# Patient Record
Sex: FEMALE | Race: WHITE | NOT HISPANIC OR LATINO | Employment: OTHER | URBAN - METROPOLITAN AREA
[De-identification: names, ages, dates, MRNs, and addresses within clinical notes are randomized per-mention and may not be internally consistent; named-entity substitution may affect disease eponyms.]

---

## 2024-07-20 ENCOUNTER — OFFICE VISIT (OUTPATIENT)
Age: 54
End: 2024-07-20
Payer: COMMERCIAL

## 2024-07-20 VITALS
TEMPERATURE: 99 F | WEIGHT: 208 LBS | RESPIRATION RATE: 18 BRPM | HEART RATE: 87 BPM | HEIGHT: 67 IN | SYSTOLIC BLOOD PRESSURE: 130 MMHG | DIASTOLIC BLOOD PRESSURE: 76 MMHG | BODY MASS INDEX: 32.65 KG/M2 | OXYGEN SATURATION: 99 %

## 2024-07-20 DIAGNOSIS — L03.313 CELLULITIS OF CHEST WALL: Primary | ICD-10-CM

## 2024-07-20 PROCEDURE — 99203 OFFICE O/P NEW LOW 30 MIN: CPT | Performed by: PREVENTIVE MEDICINE

## 2024-07-20 RX ORDER — TAMOXIFEN CITRATE 20 MG/1
20 TABLET ORAL DAILY
COMMUNITY
Start: 2024-01-31

## 2024-07-20 RX ORDER — CEPHALEXIN 500 MG/1
500 CAPSULE ORAL EVERY 6 HOURS SCHEDULED
Qty: 28 CAPSULE | Refills: 0 | Status: SHIPPED | OUTPATIENT
Start: 2024-07-20 | End: 2024-07-27

## 2024-07-20 RX ORDER — ACETAMINOPHEN 325 MG/1
650 TABLET ORAL
COMMUNITY

## 2024-07-20 NOTE — PROGRESS NOTES
[facility]        NAME: Grace Lopez is a 54 y.o. female  : 1970    MRN: [id number]  DATE: 2024  TIME: 11:51 AM    Assessment and Plan   Cellulitis of chest wall [L03.313]  1. Cellulitis of chest wall  cephalexin (KEFLEX) 500 mg capsule            Patient Instructions       Follow up with PCP in 3-5 days.  Proceed to  ER if symptoms worsen.    If tests have been performed at [facility], our office will contact you with results if changes need to be made to the care plan discussed with you at the visit.  You can review your full results on [facility].    Chief Complaint     Chief Complaint   Patient presents with    Cellulitis     C/o redness and burning feeling on ride side mid upper back. Pt. States she has a hx of breast cancer and lymphoedema in that area and has had similar sx before.          History of Present Illness       Warmth and swelling right lateral thorax where her lymphedema is        Review of Systems   Review of Systems   Skin:  Positive for color change.         Current Medications       Current Outpatient Medications:     acetaminophen (TYLENOL) 325 mg tablet, Take 650 mg by mouth, Disp: , Rfl:     cephalexin (KEFLEX) 500 mg capsule, Take 1 capsule (500 mg total) by mouth every 6 (six) hours for 7 days, Disp: 28 capsule, Rfl: 0    tamoxifen (NOLVADEX) 20 mg tablet, Take 20 mg by mouth daily, Disp: , Rfl:     tapentadol (NUCYNTA) 50 mg tablet, Take 50 mg by mouth, Disp: , Rfl:     Tapentadol HCl ER 50 MG TB12, Take 50 mg by mouth 2 (two) times a day, Disp: , Rfl:     Current Allergies     Allergies as of 2024 - Reviewed 2024   Allergen Reaction Noted    Iodides Anaphylaxis 2015    Iodinated contrast media Anaphylaxis 2012    Penicillins Anaphylaxis and Hives 2012    Leuprolide Fever 2012    Prochlorperazine Other (See Comments) 2012    Sulfa antibiotics Other (See Comments) 2012            The following portions  "of the patient's history were reviewed and updated as appropriate: allergies, current medications, past family history, past medical history, past social history, past surgical history and problem list.     Past Medical History:   Diagnosis Date    Cancer (HCC) 2015    tom. breast removal    Chronic pain disorder     r chest, back    Ovarian tumor 2012    left side       Past Surgical History:   Procedure Laterality Date    HYSTERECTOMY      full    LAPAROSCOPIC ENDOMETRIOSIS FULGURATION      MASTECTOMY Bilateral 2015    [place] EXC TUMOR SOFT TISS BACK/FLANK SUBFASCIAL 5 CM/> Right 8/17/2018    Procedure: EXCISION  BIOPSY LESION/MASS BACK;  Surgeon: Bolivar Lam MD;  Location:  MAIN OR;  Service: General    DE EXC TUMOR SOFT TISSUE ABDL WALL SUBFASCIAL <5CM Right 8/17/2018    Procedure: EXCISION  BIOPSY LESION/MASS ABDOMINAL/CHEST WALL;  Surgeon: Bolivar Lam MD;  Location:  MAIN OR;  Service: General    TUBAL LIGATION         Family History   Problem Relation Age of Onset    Hypertension Mother     Diabetes Father     Cancer Sister 45        tom. breast cancer    Hypertension Maternal Grandmother     Hypertension Maternal Grandfather     Hypertension Paternal Grandmother     Hypertension Paternal Grandfather          Medications have been verified.        Objective   /76 (BP Location: Left arm, Patient Position: Sitting)   Pulse 87   Temp 99 °F (37.2 °C)   Resp 18   Ht 5' 6.5\" (1.689 m)   Wt 94.3 kg (208 lb)   SpO2 99%   BMI 33.07 kg/m²   No LMP recorded. Patient has had a hysterectomy.       Physical Exam     Physical Exam  Skin:     Comments: Mild warmth swelling and erythema right lateral chest wall where her lymphedema is.       Note, she has told me that she has taken Keflex in the past without incident or allergy.            "

## 2025-02-27 ENCOUNTER — OFFICE VISIT (OUTPATIENT)
Age: 55
End: 2025-02-27
Payer: COMMERCIAL

## 2025-02-27 VITALS
RESPIRATION RATE: 18 BRPM | DIASTOLIC BLOOD PRESSURE: 86 MMHG | HEIGHT: 67 IN | HEART RATE: 112 BPM | TEMPERATURE: 98.7 F | WEIGHT: 194 LBS | BODY MASS INDEX: 30.45 KG/M2 | SYSTOLIC BLOOD PRESSURE: 132 MMHG | OXYGEN SATURATION: 97 %

## 2025-02-27 DIAGNOSIS — R25.1 SHAKING: ICD-10-CM

## 2025-02-27 DIAGNOSIS — H66.001 NON-RECURRENT ACUTE SUPPURATIVE OTITIS MEDIA OF RIGHT EAR WITHOUT SPONTANEOUS RUPTURE OF TYMPANIC MEMBRANE: Primary | ICD-10-CM

## 2025-02-27 LAB — GLUCOSE SERPL-MCNC: 93 MG/DL (ref 65–140)

## 2025-02-27 PROCEDURE — 99213 OFFICE O/P EST LOW 20 MIN: CPT

## 2025-02-27 RX ORDER — AZITHROMYCIN 250 MG/1
TABLET, FILM COATED ORAL
Qty: 6 TABLET | Refills: 0 | Status: SHIPPED | OUTPATIENT
Start: 2025-02-27 | End: 2025-03-03

## 2025-02-27 RX ORDER — TAPENTADOL HYDROCHLORIDE 50 MG/1
TABLET, FILM COATED ORAL
COMMUNITY

## 2025-02-27 NOTE — PROGRESS NOTES
[facility]        NAME: Grace Lopez is a 54 y.o. female  : 1970    MRN: [id number]  DATE: 2025  TIME: 6:20 PM    Assessment and Plan   Non-recurrent acute suppurative otitis media of right ear without spontaneous rupture of tympanic membrane [H66.001]  1. Non-recurrent acute suppurative otitis media of right ear without spontaneous rupture of tympanic membrane  azithromycin (ZITHROMAX) 250 mg tablet      2. Shaking  Fingerstick Glucose (POCT)        Decongestants and flonase recommended for congestion. Hydration, humidifier, rest.   Advised patient to make apt to see PCP regarding shaking and tremor. Discussed they need to do a full workup regarding this, likely testing her thyroid function as well. Glucose in office today was WNL. Patient agreeable.   Follow up with PCP in 3-5 days if no improvement. Proceed to ER if symptoms worsen.    Medical Decision Making     PROBLEM: 1 acute, uncomplicated illness or injury    DATA: Note(s) Reviewed: yes, chart review    RISK: Prescription drug management    TIME: 20 min     Chief Complaint     Chief Complaint   Patient presents with    Shaking     Around the end of last month she was fighting a cold and now after about a month she still has a cough, congestion, and clear mucus. About four days she began shaking. She noticed that when she eats sugar she stops shaking but it only controls it for a short period. She has tried changing her diet to help the shaking but noticed no relief. She has also lost weight due to poor appetite. Yesterday she began experiencing right ear pain. She has only tried Museux but hasn't helped.      History of Present Illness     Grace Lopez is a 54 y.o. female presenting to the office today for upper respiratory complaints.   Symptoms have been present for 30+ days, and include cough, congestion, clear mucus, right ear pain.   She also notes about 4 days ago she began shaking, and eating sugar helped this  symptom. She does note she has lost weight recently due to poor appetite. She does note she has been eating, but not as much. She is unsure why the tremor started and she would like her glucose checked.  She has tried Mucinex for her symptoms, minimal relief.    Review of Systems     Review of Systems   Constitutional:  Negative for chills and fever.   HENT:  Positive for congestion. Negative for sore throat and trouble swallowing.    Respiratory:  Positive for cough. Negative for shortness of breath and wheezing.    Gastrointestinal:  Negative for nausea and vomiting.   Genitourinary: Negative.    Musculoskeletal: Negative.    Skin: Negative.    Neurological: Negative.        Current Medications       Current Outpatient Medications:     acetaminophen (TYLENOL) 325 mg tablet, Take 650 mg by mouth, Disp: , Rfl:     azithromycin (ZITHROMAX) 250 mg tablet, Take 2 tablets today then 1 tablet daily x 4 days, Disp: 6 tablet, Rfl: 0    Nucynta 50 MG tablet, take 1 tablet by mouth every 12 hours if needed for MODERATE PAIN ( PAIN SCALE 4-6 ), Disp: , Rfl:     Tapentadol HCl ER 50 MG TB12, Take 50 mg by mouth 2 (two) times a day, Disp: , Rfl:     tamoxifen (NOLVADEX) 20 mg tablet, Take 20 mg by mouth daily (Patient not taking: Reported on 2/27/2025), Disp: , Rfl:     Current Allergies     Allergies as of 02/27/2025 - Reviewed 02/27/2025   Allergen Reaction Noted    Iodides Anaphylaxis 04/08/2015    Iodinated contrast media Anaphylaxis 07/11/2012    Penicillins Anaphylaxis and Hives 07/11/2012    Leuprolide Fever 07/11/2012    Prochlorperazine Other (See Comments) 07/11/2012    Sulfa antibiotics Other (See Comments) 07/11/2012            The following portions of the patient's history were reviewed and updated as appropriate: allergies, current medications, past family history, past medical history, past social history, past surgical history and problem list.     Past Medical History:   Diagnosis Date    Cancer (HCC) 2015     "tom. breast removal    Chronic pain disorder     r chest, back    Ovarian tumor 2012    left side       Past Surgical History:   Procedure Laterality Date    HYSTERECTOMY      full    LAPAROSCOPIC ENDOMETRIOSIS FULGURATION      MASTECTOMY Bilateral 2015    [place] EXC TUMOR SOFT TISS BACK/FLANK SUBFASCIAL 5 CM/> Right 8/17/2018    Procedure: EXCISION  BIOPSY LESION/MASS BACK;  Surgeon: Bolivar Lam MD;  Location:  MAIN OR;  Service: General    [place] EXC TUMOR SOFT TISSUE ABDL WALL SUBFASCIAL <5CM Right 8/17/2018    Procedure: EXCISION  BIOPSY LESION/MASS ABDOMINAL/CHEST WALL;  Surgeon: Bolivar Lam MD;  Location:  MAIN OR;  Service: General    TUBAL LIGATION         Family History   Problem Relation Age of Onset    Hypertension Mother     Diabetes Father     Cancer Sister 45        tom. breast cancer    Hypertension Maternal Grandmother     Hypertension Maternal Grandfather     Hypertension Paternal Grandmother     Hypertension Paternal Grandfather        Medications have been verified.    Objective     /86 (BP Location: Left arm, Patient Position: Sitting, Cuff Size: Standard)   Pulse (!) 112   Temp 98.7 °F (37.1 °C) (Tympanic)   Resp 18   Ht 5' 6.5\" (1.689 m)   Wt 88 kg (194 lb)   SpO2 97%   BMI 30.84 kg/m²   No LMP recorded. Patient has had a hysterectomy.     Physical Exam     Physical Exam  Vitals and nursing note reviewed.   Constitutional:       General: She is not in acute distress.     Appearance: Normal appearance. She is normal weight. She is not ill-appearing, toxic-appearing or diaphoretic.   HENT:      Head: Normocephalic and atraumatic.      Right Ear: Ear canal and external ear normal. No swelling or tenderness. No middle ear effusion. There is no impacted cerumen. Tympanic membrane is erythematous and bulging.      Left Ear: Tympanic membrane, ear canal and external ear normal. No swelling or tenderness.  No middle ear effusion. There is no impacted cerumen. Tympanic membrane is not " erythematous or bulging.      Nose: Congestion and rhinorrhea present.      Mouth/Throat:      Mouth: Mucous membranes are moist.      Pharynx: Posterior oropharyngeal erythema present. No oropharyngeal exudate.   Eyes:      General: No scleral icterus.        Right eye: No discharge.         Left eye: No discharge.      Conjunctiva/sclera: Conjunctivae normal.   Cardiovascular:      Rate and Rhythm: Normal rate and regular rhythm.      Pulses: Normal pulses.      Heart sounds: Normal heart sounds. No murmur heard.     No friction rub. No gallop.   Pulmonary:      Effort: Pulmonary effort is normal. No respiratory distress.      Breath sounds: Normal breath sounds. No stridor. No wheezing, rhonchi or rales.   Chest:      Chest wall: No tenderness.   Musculoskeletal:         General: Normal range of motion.      Cervical back: Normal range of motion and neck supple. No rigidity or tenderness.   Lymphadenopathy:      Cervical: No cervical adenopathy.   Skin:     General: Skin is warm and dry.      Capillary Refill: Capillary refill takes less than 2 seconds.      Coloration: Skin is not jaundiced.      Findings: No bruising or rash.   Neurological:      General: No focal deficit present.      Mental Status: She is alert. Mental status is at baseline.   Psychiatric:         Mood and Affect: Mood normal.         Behavior: Behavior normal.

## 2025-02-27 NOTE — PATIENT INSTRUCTIONS
"Patient Education     Ear infections in adults   The Basics   Written by the doctors and editors at [facility]   What is an ear infection? -- An ear infection is a condition that can cause pain in the ear, fever, and trouble hearing. Ear infections are more common in children than in adults.  Ear infections often occur after a cold or problem with seasonal allergies. Ear infections in adults happen more often in people who have a problem with their Eustachian tubes. The Eustachian tube connects the middle ear (the part of the ear behind the eardrum) to the back of the nose and throat.  Fluid can build up in the middle part of the ear behind the eardrum. This fluid can become infected and press on the eardrum, causing it to bulge (figure 1). This causes symptoms.  The medical term for middle ear infections is \"otitis media.\"  What are the symptoms of an ear infection? -- In adults, the symptoms include:   Ear pain   Temporary hearing loss   Feeling dizzy  How do I know if I have an ear infection? -- If you think that you have an ear infection, see a doctor or nurse. They will ask you about your symptoms, do an exam, and look in your ears.  How is an ear infection treated? -- Doctors treat ear infections in adults with antibiotics. These medicines kill the bacteria that cause some ear infections. Even though some ear infections are caused by a virus, doctors often prescribe antibiotics for adults anyway. That's because ear infections can lead to other problems if they are not treated quickly.  Is there anything I can do on my own to feel better?    Take all of your medicines as instructed. If the doctor prescribes antibiotics, finish all of them, even if you start to feel better.   You can take non-prescription medicines to relieve pain. Examples include acetaminophen (sample brand name: Tylenol), ibuprofen (sample brand names: Advil, Motrin), or naproxen (sample brand name: Aleve).   You can try ice or heat to help " "with ear pain. Do not sleep with ice or heat on your ear.   Put a cold gel pack, bag of ice, or bag of frozen vegetables on the ear every 1 to 2 hours, for 15 minutes each time. Put a thin towel between the ice (or other cold object) and the skin.   Put a heating pad, warm towel, or hot water bottle on the ear every 1 to 2 hours, for 15 minutes at a time. Put a thin towel between the warm object and the skin.   Do not put anything in your ear unless the doctor or nurse told you to.   Airplane travel can make ear pain worse, especially as the plane starts to land. Chewing gum, drinking, or eating food might help.  Can ear infections be prevented? -- To lower your risk of getting an ear infection:   If you smoke, try to stop. Avoid places where others are smoking.   Wash your hands often.   Stay away from others who are sick with a cold or viral infection.   Get all of the vaccines your doctor recommends.  When should I call the doctor? -- Call for advice if:   Your symptoms are not getting better in 2 to 3 days.   You continue to have hearing problems after 2 to 3 weeks.   You have a fever of 100.4°F (38°C) or higher, or chills.   You have discharge or drainage coming from your ear.  All topics are updated as new evidence becomes available and our peer review process is complete.  This topic retrieved from [employer] on: May 15, 2024.  Topic 299146 Version 1.0  Release: 32.4.3 - C32.134  © 2024 UpToDate, Inc. and/or its affiliates. All rights reserved.  figure 1: Ear infection (otitis media)     The ear on the left is normal and does not have an infection. The ear on the right shows what an infection can look like. The infected fluid in the middle ear causes the eardrum to bulge. Normally, fluid in the middle ear drains into the throat through a tube called the \"Eustachian tube.\" But during an infection, swelling blocks off the tube, so fluid builds up.  Graphic 58119 Version 8.0  Consumer Information Use and " Disclaimer   Disclaimer: This generalized information is a limited summary of diagnosis, treatment, and/or medication information. It is not meant to be comprehensive and should be used as a tool to help the user understand and/or assess potential diagnostic and treatment options. It does NOT include all information about conditions, treatments, medications, side effects, or risks that may apply to a specific patient. It is not intended to be medical advice or a substitute for the medical advice, diagnosis, or treatment of a health care provider based on the health care provider's examination and assessment of a patient's specific and unique circumstances. Patients must speak with a health care provider for complete information about their health, medical questions, and treatment options, including any risks or benefits regarding use of medications. This information does not endorse any treatments or medications as safe, effective, or approved for treating a specific patient. UpToDate, Inc. and its affiliates disclaim any warranty or liability relating to this information or the use thereof.The use of this information is governed by the Terms of Use, available at https://www.[employer]olters[employer] Agencyuwer.com/en/know/clinical-effectiveness-terms. 2024© UpToDate, Inc. and its affiliates and/or licensors. All rights reserved.  Copyright   © 2024 UpToDate, Inc. and/or its affiliates. All rights reserved.